# Patient Record
Sex: MALE | Race: WHITE | NOT HISPANIC OR LATINO | URBAN - METROPOLITAN AREA
[De-identification: names, ages, dates, MRNs, and addresses within clinical notes are randomized per-mention and may not be internally consistent; named-entity substitution may affect disease eponyms.]

---

## 2024-02-26 ENCOUNTER — LAB (OUTPATIENT)
Dept: LAB | Facility: LAB | Age: 29
End: 2024-02-26
Payer: COMMERCIAL

## 2024-02-26 ENCOUNTER — HOSPITAL ENCOUNTER (OUTPATIENT)
Dept: RADIOLOGY | Facility: CLINIC | Age: 29
Discharge: HOME | End: 2024-02-26
Payer: COMMERCIAL

## 2024-02-26 DIAGNOSIS — M25.542 PAIN IN JOINTS OF LEFT HAND: ICD-10-CM

## 2024-02-26 DIAGNOSIS — Z00.00 ENCOUNTER FOR GENERAL ADULT MEDICAL EXAMINATION WITHOUT ABNORMAL FINDINGS: ICD-10-CM

## 2024-02-26 DIAGNOSIS — E46 UNSPECIFIED PROTEIN-CALORIE MALNUTRITION (MULTI): ICD-10-CM

## 2024-02-26 DIAGNOSIS — M25.541 PAIN IN JOINTS OF RIGHT HAND: Primary | ICD-10-CM

## 2024-02-26 DIAGNOSIS — R53.83 OTHER FATIGUE: ICD-10-CM

## 2024-02-26 LAB
ALBUMIN SERPL BCP-MCNC: 4.2 G/DL (ref 3.4–5)
ALP SERPL-CCNC: 62 U/L (ref 33–120)
ALT SERPL W P-5'-P-CCNC: 21 U/L (ref 10–52)
ANION GAP SERPL CALC-SCNC: 13 MMOL/L (ref 10–20)
AST SERPL W P-5'-P-CCNC: 17 U/L (ref 9–39)
BILIRUB SERPL-MCNC: 0.4 MG/DL (ref 0–1.2)
BUN SERPL-MCNC: 17 MG/DL (ref 6–23)
CALCIUM SERPL-MCNC: 9.6 MG/DL (ref 8.6–10.6)
CHLORIDE SERPL-SCNC: 102 MMOL/L (ref 98–107)
CHOLEST SERPL-MCNC: 165 MG/DL (ref 0–199)
CHOLESTEROL/HDL RATIO: 4
CO2 SERPL-SCNC: 29 MMOL/L (ref 21–32)
CREAT SERPL-MCNC: 0.81 MG/DL (ref 0.5–1.3)
CRP SERPL HS-MCNC: 20.2 MG/L
EGFRCR SERPLBLD CKD-EPI 2021: >90 ML/MIN/1.73M*2
ERYTHROCYTE [SEDIMENTATION RATE] IN BLOOD BY WESTERGREN METHOD: 27 MM/H (ref 0–15)
GLUCOSE SERPL-MCNC: 72 MG/DL (ref 74–99)
HDLC SERPL-MCNC: 41.1 MG/DL
LDLC SERPL CALC-MCNC: 105 MG/DL
NON HDL CHOLESTEROL: 124 MG/DL (ref 0–149)
POTASSIUM SERPL-SCNC: 4.3 MMOL/L (ref 3.5–5.3)
PROT SERPL-MCNC: 7.1 G/DL (ref 6.4–8.2)
SODIUM SERPL-SCNC: 140 MMOL/L (ref 136–145)
TRIGL SERPL-MCNC: 95 MG/DL (ref 0–149)
VLDL: 19 MG/DL (ref 0–40)

## 2024-02-26 PROCEDURE — 86141 C-REACTIVE PROTEIN HS: CPT

## 2024-02-26 PROCEDURE — 36415 COLL VENOUS BLD VENIPUNCTURE: CPT

## 2024-02-26 PROCEDURE — 80061 LIPID PANEL: CPT

## 2024-02-26 PROCEDURE — 80053 COMPREHEN METABOLIC PANEL: CPT

## 2024-02-26 PROCEDURE — 85652 RBC SED RATE AUTOMATED: CPT

## 2024-02-26 PROCEDURE — 73130 X-RAY EXAM OF HAND: CPT | Mod: LEFT SIDE | Performed by: RADIOLOGY

## 2024-02-26 PROCEDURE — 73130 X-RAY EXAM OF HAND: CPT | Mod: LT

## 2024-03-18 ENCOUNTER — LAB (OUTPATIENT)
Dept: LAB | Facility: LAB | Age: 29
End: 2024-03-18
Payer: COMMERCIAL

## 2024-03-18 DIAGNOSIS — M25.531 ARTHRALGIA OF RIGHT WRIST: ICD-10-CM

## 2024-03-18 DIAGNOSIS — R70.0 ELEVATED ERYTHROCYTE SEDIMENTATION RATE: ICD-10-CM

## 2024-03-18 PROCEDURE — 36415 COLL VENOUS BLD VENIPUNCTURE: CPT

## 2024-03-18 PROCEDURE — 86038 ANTINUCLEAR ANTIBODIES: CPT

## 2024-03-19 LAB — ANA SER QL HEP2 SUBST: NEGATIVE

## 2024-08-05 ENCOUNTER — APPOINTMENT (OUTPATIENT)
Dept: RHEUMATOLOGY | Facility: CLINIC | Age: 29
End: 2024-08-05
Payer: COMMERCIAL

## 2024-08-05 ENCOUNTER — LAB (OUTPATIENT)
Dept: LAB | Facility: LAB | Age: 29
End: 2024-08-05
Payer: COMMERCIAL

## 2024-08-05 VITALS — DIASTOLIC BLOOD PRESSURE: 84 MMHG | SYSTOLIC BLOOD PRESSURE: 132 MMHG | WEIGHT: 245 LBS

## 2024-08-05 DIAGNOSIS — M19.90 ARTHRITIS: ICD-10-CM

## 2024-08-05 DIAGNOSIS — M19.90 ARTHRITIS: Primary | ICD-10-CM

## 2024-08-05 LAB
CCP IGG SERPL-ACNC: <1 U/ML
CRP SERPL-MCNC: 1.22 MG/DL
ERYTHROCYTE [DISTWIDTH] IN BLOOD BY AUTOMATED COUNT: 13.6 % (ref 11.5–14.5)
ERYTHROCYTE [SEDIMENTATION RATE] IN BLOOD BY WESTERGREN METHOD: 45 MM/H (ref 0–15)
HCT VFR BLD AUTO: 40.5 % (ref 41–52)
HGB BLD-MCNC: 13.1 G/DL (ref 13.5–17.5)
MCH RBC QN AUTO: 28.4 PG (ref 26–34)
MCHC RBC AUTO-ENTMCNC: 32.3 G/DL (ref 32–36)
MCV RBC AUTO: 88 FL (ref 80–100)
NRBC BLD-RTO: 0 /100 WBCS (ref 0–0)
PLATELET # BLD AUTO: 310 X10*3/UL (ref 150–450)
RBC # BLD AUTO: 4.62 X10*6/UL (ref 4.5–5.9)
RHEUMATOID FACT SER NEPH-ACNC: <10 IU/ML (ref 0–15)
URATE SERPL-MCNC: 5.4 MG/DL (ref 4–7.5)
WBC # BLD AUTO: 11.9 X10*3/UL (ref 4.4–11.3)

## 2024-08-05 PROCEDURE — 36415 COLL VENOUS BLD VENIPUNCTURE: CPT

## 2024-08-05 PROCEDURE — 81381 HLA I TYPING 1 ALLELE HR: CPT

## 2024-08-05 PROCEDURE — 84550 ASSAY OF BLOOD/URIC ACID: CPT

## 2024-08-05 PROCEDURE — 99204 OFFICE O/P NEW MOD 45 MIN: CPT | Performed by: INTERNAL MEDICINE

## 2024-08-05 PROCEDURE — 86431 RHEUMATOID FACTOR QUANT: CPT

## 2024-08-05 PROCEDURE — 86140 C-REACTIVE PROTEIN: CPT

## 2024-08-05 PROCEDURE — 85027 COMPLETE CBC AUTOMATED: CPT

## 2024-08-05 PROCEDURE — 86200 CCP ANTIBODY: CPT

## 2024-08-05 PROCEDURE — 85652 RBC SED RATE AUTOMATED: CPT

## 2024-08-05 NOTE — PROGRESS NOTES
"Subjective  . Dorian Tickle \"Dorian Tickle\" is a 29 y.o. male who presents for Joint Pain and New Patient Visit.    HPI.  29-year-old male with history of plantar fasciitis referred by primary care physician Elijah Sheehan in consultation for joint pain evaluation.    He stated that he has been having pain in left hand and both knees for more than a year.  Left hand pain is mainly in the left index and ring finger.  Left knee hurts more than right.  Sometimes he notes swelling of the left knee.  Joint pain is worse in the morning up to 6/10.  He takes 2 Aleve's and feels better after 3 hours.      X-ray of the hand obtained in February, 2024 was unremarkable.  Blood work obtained in March 2024 showed elevated sed rate and CRP.  ELVER was negative.    He has no history of review of pleuritic chest pain, shortness of breath, fever, chills, night sweats, weight loss, dry eyes, dry mouth, skin rash,photosensitivity, mucocutaneous ulcers, hair loss, iritis, Raynaud's phenomena, hematemesis, hemoptysis, anxiety, depression, seizures, chronic low back pain, psoriasis, recurrent infections and inflammatory bowel disease.     Social history: He is .  He quit smoking in 2018-19.  He rarely drinks alcoholic beverages.  He works with [employer].  Family history: Mother has carpal tunnel syndrome.    Review of Systems   All other systems reviewed and are negative.    Objective  .   Blood pressure 132/84, weight 111 kg (245 lb).    Physical Exam.  Gen. AAO x3, NAD.  HEENT: No pallor or icterus, PERRLA, EOMI. Oropharynx is clear. MM moist,Parotid glands  not enlarged. No cervical lymphadenopathy .  Skin: No rashes.  Heart: S1, S2/ RRR. No murmurs or gallops.  Lungs: CTA B.  Abdomen: Soft, NT/ND, BS regular.  MSK: Mild tenderness of the left index finger PIP and DIP joint.  Mild tenderness of the left ring finger PIP joint.  Mild tenderness of the ring finger flexor tendon at A1 pulley.  Bilateral wrist, elbow without swelling " and tenderness.  Bilateral shoulder without swelling and tenderness.  Neck, spine and SI joint without tenderness.  Bilateral Peewee's negative.  Bilateral trochanteric bursa without tenderness.  Bilateral knee without erythema, swelling or tenderness.  Knee range of motion full.  Bilateral ankle and MTPs without swelling and tenderness.    Neuro: CN II-XII intact. Sensation to touch intact.Strength 5/5 throughout. DTR 2+ and symmetrical.  Psych:Appropriate mood and behavior  EXT: No edema    Assessment/Plan  . 29-year-old male with history of plantar fasciitis presented with chronic pain in left hand and knees.  Previous x-ray of the left hand obtained about 6 months ago was unremarkable.  Blood work obtained in March showed elevated sed rate and CRP.  ELVER was negative.  Mild tenderness of the left fourth flexor tendon at A1 pulley.  No active synovitis the hands and wrists noted.  Both knees without tenderness and effusion.  Will obtain labs for further evaluation.  Continue Aleve as needed.     This note was partially generated using the Dragon Voice recognition system. There may be some incorrect wording, spelling and/or spelling errors or punctuation errors that were not corrected prior to committing the note to the medical record.      Problem List Items Addressed This Visit    None  Visit Diagnoses       Arthritis    -  Primary    Relevant Orders    Rheumatoid Factor    C-Reactive Protein    Citrulline Antibody, IgG    CBC    Sedimentation Rate    Uric Acid    HLAB27 Typing                 Active Ambulatory Problems     Diagnosis Date Noted    No Active Ambulatory Problems     Resolved Ambulatory Problems     Diagnosis Date Noted    No Resolved Ambulatory Problems     Past Medical History:   Diagnosis Date    Plantar fasciitis        Family History   Problem Relation Name Age of Onset    Carpal tunnel syndrome Mother         No past surgical history on file.    Social History     Tobacco Use   Smoking Status  Never   Smokeless Tobacco Current   Tobacco Comments    Pt states he only consumes nicotine patches        Allergies  Patient has no known allergies.    Current Meds  No current outpatient medications     Lab Results   Component Value Date    SEDRATE 27 (H) 02/26/2024             Rober Vega MD

## 2024-08-07 LAB — HLAB27 TYPING: NEGATIVE

## 2024-09-27 DIAGNOSIS — M25.562 CHRONIC PAIN OF LEFT KNEE: ICD-10-CM

## 2024-09-27 DIAGNOSIS — M25.572 ACUTE LEFT ANKLE PAIN: ICD-10-CM

## 2024-09-27 DIAGNOSIS — G89.29 CHRONIC PAIN OF LEFT KNEE: ICD-10-CM

## 2024-10-03 ENCOUNTER — APPOINTMENT (OUTPATIENT)
Dept: ORTHOPEDIC SURGERY | Facility: CLINIC | Age: 29
End: 2024-10-03
Payer: COMMERCIAL

## 2024-10-03 ENCOUNTER — HOSPITAL ENCOUNTER (OUTPATIENT)
Dept: RADIOLOGY | Facility: HOSPITAL | Age: 29
Discharge: HOME | End: 2024-10-03
Payer: COMMERCIAL

## 2024-10-03 VITALS — HEIGHT: 71 IN | WEIGHT: 245 LBS | BODY MASS INDEX: 34.3 KG/M2

## 2024-10-03 DIAGNOSIS — M76.62 LEFT ACHILLES TENDINITIS: ICD-10-CM

## 2024-10-03 DIAGNOSIS — M25.562 ANTERIOR KNEE PAIN, LEFT: Primary | ICD-10-CM

## 2024-10-03 DIAGNOSIS — G89.29 CHRONIC PAIN OF LEFT KNEE: ICD-10-CM

## 2024-10-03 DIAGNOSIS — M76.62 ACHILLES TENDINITIS OF LEFT LOWER EXTREMITY: ICD-10-CM

## 2024-10-03 DIAGNOSIS — M25.572 ACUTE LEFT ANKLE PAIN: ICD-10-CM

## 2024-10-03 DIAGNOSIS — M25.562 CHRONIC PAIN OF LEFT KNEE: ICD-10-CM

## 2024-10-03 DIAGNOSIS — M25.562 LEFT ANTERIOR KNEE PAIN: ICD-10-CM

## 2024-10-03 PROCEDURE — 73562 X-RAY EXAM OF KNEE 3: CPT | Mod: LT

## 2024-10-03 PROCEDURE — 73610 X-RAY EXAM OF ANKLE: CPT | Mod: LT

## 2024-10-03 PROCEDURE — 99204 OFFICE O/P NEW MOD 45 MIN: CPT | Performed by: SPECIALIST

## 2024-10-03 PROCEDURE — 3008F BODY MASS INDEX DOCD: CPT | Performed by: SPECIALIST

## 2024-10-03 PROCEDURE — 99214 OFFICE O/P EST MOD 30 MIN: CPT | Performed by: SPECIALIST

## 2024-10-03 NOTE — PROGRESS NOTES
NPV Left Knee/Ankle XR done today   Patient states he has had ongoing pain for several months no known injury   He has been working with a rheumatologist in the lab work has shown some elevated inflammatory markers, sed rate and CRP.  Negative ELVER negative RA negative HLA-B27.  His pain is with activity and relieved with rest without obvious neurovascular components.  Denies erythema warmth swelling and regions of pain which is anterolateral left knee and posterior left ankle.  He resolved a recent onset of plantar fasciitis with conservative measures.  He denies any recent history of infections or STDs.    Exam: Alert and oriented x 3 in no distress.  In stance he has normal lower extremity alignment no signs of erythema or swelling.  He has full passive motion of the left knee foot and ankle regions.  No palpable warmth, no effusion.  Has pain to palpation of the left knee lateral retinaculum of the patella.  Stable ligamentous testing full range of motion.  Left ankle exam is negative except for pain to palpation of his Achilles tendon.    Standing radiographs left knee and left ankle are completely within normal today.    Assessment plan: Left anterior knee pain syndrome, left Achilles tendinitis.  I would still be concerned that there is an undiagnosed rheumatologic syndrome to explain all these regions of pain.  In the meantime for the ankle and knee were going to recommend a course of physical therapy, and he should routinely take his Aleve twice a day for a month.  Follow-up if no improvement, but I reassured him there is no obvious orthopedic surgical issues today.

## 2025-02-10 ENCOUNTER — APPOINTMENT (OUTPATIENT)
Dept: RHEUMATOLOGY | Facility: CLINIC | Age: 30
End: 2025-02-10
Payer: COMMERCIAL

## 2025-02-10 VITALS
SYSTOLIC BLOOD PRESSURE: 116 MMHG | DIASTOLIC BLOOD PRESSURE: 78 MMHG | HEIGHT: 71 IN | BODY MASS INDEX: 34.58 KG/M2 | WEIGHT: 247 LBS | HEART RATE: 72 BPM

## 2025-02-10 DIAGNOSIS — L40.9 PSORIASIS: ICD-10-CM

## 2025-02-10 DIAGNOSIS — L40.50 PSORIATIC ARTHRITIS (MULTI): Primary | ICD-10-CM

## 2025-02-10 PROCEDURE — 3008F BODY MASS INDEX DOCD: CPT | Performed by: INTERNAL MEDICINE

## 2025-02-10 PROCEDURE — 99213 OFFICE O/P EST LOW 20 MIN: CPT | Performed by: INTERNAL MEDICINE

## 2025-02-10 RX ORDER — METHOTREXATE 2.5 MG/1
15 TABLET ORAL WEEKLY
Qty: 24 TABLET | Refills: 1 | Status: SHIPPED | OUTPATIENT
Start: 2025-02-10 | End: 2025-03-12

## 2025-02-10 RX ORDER — FOLIC ACID 1 MG/1
1 TABLET ORAL DAILY
Qty: 30 TABLET | Refills: 1 | Status: SHIPPED | OUTPATIENT
Start: 2025-02-10 | End: 2025-03-12

## 2025-02-10 RX ORDER — CLOBETASOL PROPIONATE 0.5 MG/G
OINTMENT TOPICAL 2 TIMES DAILY
Qty: 15 G | Refills: 3 | Status: SHIPPED | OUTPATIENT
Start: 2025-02-10

## 2025-02-10 ASSESSMENT — RHEUMATOLOGY NEW PATIENT QUESTIONNAIRE
MEMORY LOSS: N
UNEXPLAINED HEARING LOSS: N
EASILY LOSING TEMPER: N
ANXIETY: N
DEPRESSION: N
EYE REDNESS: N
NUMBNESS OR TINGLING IN HANDS OR FEET: N
HOW WOULD YOU DESCRIBE YOUR STIFFNESS ON AVERAGE: MODERATE
SUN SENSITIVE (SUN ALLERGY): N
LOSS OF VISION: N
MORNING STIFFNESS IN LOWER BACK: N
SKIN REDNESS: N
UNUSUAL BLEEDING: N
MUSCLE WEAKNESS: N
NODULES/BUMPS: N
HEADACHES: N
SORES IN MOUTH OR NOSE: N
SWOLLEN LEGS OR FEET: N
STOMACH PAIN: N
SHORTNESS OF BREATH: N
BLOOD IN STOOLS: N
BEHAVIORAL CHANGES: N
SKIN TIGHTNESS: N
MORNING STIFFNESS: Y
NAUSEA: N
EYE PAIN: N
ABNORMAL URINE: N
PERSISTENT DIARRHEA: N
DIFFICULTY SWALLOWING: N
PAIN OR BURNING ON URINATION: N
DIFFICULTY FALLING ASLEEP: N
DIFFICULTY BREATHING LYING DOWN: N
FEVER: N
DRYNESS OF MOUTH: N
JOINT SWELLING: Y
BLACK STOOLS: N
EASY BRUISING: N
COUGH: N
HOARSE VOICE: N
SEIZURES: N
VOMITING OF BLOOD OR COFFEE GROUND CONSISTENCY MATERIAL: N
DOUBLE OR BLURRED VISION: N
LOSS OF CONSCIOUSNESS: N
UNUSUAL FATIGUE: N
COLOR CHANGES OF HANDS OR FEET IN THE COLD: N
UNUSUALLY RAPID OR SLOWED HEART RATE: N
SWOLLEN OR TENDER GLANDS: N
NIGHT SWEATS: N
EXCESSIVE HAIR LOSS (MORE THAN YOUR NORM): N
INCREASED SUSCEPTIBILITY TO INFECTION: N
FAINTING: N
UNEXPLAINED WEIGHT CHANGE: N
JOINT PAIN: Y
HEARTBURN OR REFLUX: N
ANEMIA: N
RASH: Y
EYE DRYNESS: N
AGITATION: N
CHEST PAIN: N
RASH OR ULCERS: N
DIFFICULTY STAYING ASLEEP: N
JAUNDICE: N

## 2025-02-10 ASSESSMENT — PAIN SCALES - GENERAL: PAINLEVEL_OUTOF10: 2

## 2025-02-10 NOTE — PROGRESS NOTES
"Subjective . Dorian Khoury is a 29 y.o. male who presents for Establish Care (Joint stiffness, rash on the head,).    HPI. 29-year-old male with history of left Achilles tendinitis, plantar fasciitis presented with chronic pain in left hand and knees presented for follow-up.     He reports pain, swelling and stiffness of the left hand with difficulty of making full fist.  Also reports pain in the left foot.  Joint pain is worse in the morning and in the evening.  He reports morning stiffness up to 1 hour.     He has noted dry scaly rashes at the forehead and that the bellybutton about 3 months ago.    Review of Systems   All other systems reviewed and are negative.    Objective     Blood pressure 116/78, pulse 72, height 1.803 m (5' 11\"), weight 112 kg (247 lb).    Physical Exam.  Gen. AAO x3, NAD.  HEENT: No pallor or icterus, PERRLA, EOMI. No cervical lymphadenopathy .  Skin: 3 x 3 cm erythematous with dry scaling rash at the forehead and erythematous rash at the bellybutton.  Heart: S1, S2/ RRR.   Lungs: CTA B.  Abdomen: Soft, NT/ND, BS regular.  MSK: Left 2, 3 MCP and left middle finger PIP joint with swelling and tenderness.  Right hand bilateral wrist, elbow and shoulder without swelling and tenderness.  Knee without swelling and tenderness.  Bilateral ankle and MTPs without swelling and tenderness.   Neuro:  Sensation to touch intact.Strength 5/5 throughout.   Psych:Appropriate mood and behavior  EXT: No edema    Assessment/Plan .    #1: Psoriatic arthritis.  -Patient was counseled regarding the diagnosis, management and outcomes in length.  -Begin methotrexate 15 mg/week.  Side effect discussed in length.  -Begin folic acid 1 mg daily.    #2: Psoriasis.  -Begin clobetasol.    Follow-up in 2 months.     This note was partially generated using the Dragon Voice recognition system. There may be some incorrect wording, spelling and/or spelling errors or punctuation errors that were not corrected prior to " committing the note to the medical record.            Problem List Items Addressed This Visit    None  Visit Diagnoses       Psoriasis    -  Primary    Relevant Medications    clobetasol (Temovate) 0.05 % ointment    Psoriatic arthritis (Multi)        Relevant Medications    methotrexate (Trexall) 2.5 mg tablet    folic acid (Folvite) 1 mg tablet                 Active Ambulatory Problems     Diagnosis Date Noted    No Active Ambulatory Problems     Resolved Ambulatory Problems     Diagnosis Date Noted    No Resolved Ambulatory Problems     Past Medical History:   Diagnosis Date    Plantar fasciitis        Family History   Problem Relation Name Age of Onset    Carpal tunnel syndrome Mother         No past surgical history on file.    Social History     Tobacco Use   Smoking Status Never   Smokeless Tobacco Current   Tobacco Comments    Pt states he only consumes nicotine patches        Allergies  Patient has no known allergies.    Current Meds  Current Outpatient Medications   Medication Instructions    clobetasol (Temovate) 0.05 % ointment Topical, 2 times daily    folic acid (FOLVITE) 1 mg, oral, Daily    methotrexate (TREXALL) 15 mg, oral, Weekly, Follow directions carefully, and ask to explain any part you do not understand. Take exactly as directed.        Lab Results   Component Value Date    HLAB27 Negative 08/05/2024    RF <10 08/05/2024    SEDRATE 45 (H) 08/05/2024    CRP 1.22 (H) 08/05/2024    URICACID 5.4 08/05/2024             Rober Vega MD

## 2025-03-30 ENCOUNTER — OFFICE VISIT (OUTPATIENT)
Dept: URGENT CARE | Age: 30
End: 2025-03-30
Payer: COMMERCIAL

## 2025-03-30 VITALS
OXYGEN SATURATION: 99 % | TEMPERATURE: 98.7 F | SYSTOLIC BLOOD PRESSURE: 138 MMHG | DIASTOLIC BLOOD PRESSURE: 90 MMHG | HEART RATE: 98 BPM | RESPIRATION RATE: 17 BRPM

## 2025-03-30 DIAGNOSIS — R10.12 LEFT UPPER QUADRANT ABDOMINAL PAIN: ICD-10-CM

## 2025-03-30 DIAGNOSIS — R19.7 DIARRHEA, UNSPECIFIED TYPE: ICD-10-CM

## 2025-03-30 DIAGNOSIS — R11.2 NAUSEA AND VOMITING, UNSPECIFIED VOMITING TYPE: Primary | ICD-10-CM

## 2025-03-30 PROCEDURE — 99204 OFFICE O/P NEW MOD 45 MIN: CPT | Performed by: NURSE PRACTITIONER

## 2025-03-30 RX ORDER — DICYCLOMINE HYDROCHLORIDE 20 MG/1
20 TABLET ORAL 4 TIMES DAILY PRN
Qty: 20 TABLET | Refills: 0 | Status: SHIPPED | OUTPATIENT
Start: 2025-03-30 | End: 2025-04-04

## 2025-03-30 ASSESSMENT — ENCOUNTER SYMPTOMS
ARTHRALGIAS: 0
ABDOMINAL PAIN: 1
CHILLS: 0
RECENT COUGH: 0
HEADACHES: 0
MYALGIAS: 0
VOMITING: 0
DIARRHEA: 1
FEVER: 0
DIAPHORESIS: 0

## 2025-03-30 NOTE — PROGRESS NOTES
Subjective   Patient ID: Dorian Khoury is a 29 y.o. male. They present today with a chief complaint of Abdominal Pain (Complains of abdominal pain, possible nausea and diarrhea for 3 days. ).    History of Present Illness  Patient denies nausea and vomiting today.      History provided by:  Patient   used: No    Diarrhea  Quality:  Watery  Onset quality:  Sudden  Number of episodes:  Several  Duration:  3 days  Timing:  Intermittent  Progression:  Unchanged  Relieved by:  None tried  Worsened by:  Nothing  Ineffective treatments:  None tried  Associated symptoms: abdominal pain    Associated symptoms: no arthralgias, no chills, no recent cough, no diaphoresis, no fever, no headaches, no myalgias, no URI and no vomiting    Associated symptoms comment:  Patient denies nausea and bloody stool.      Past Medical History  Allergies as of 03/30/2025    (No Known Allergies)       (Not in a hospital admission)       Past Medical History:   Diagnosis Date    Plantar fasciitis        No past surgical history on file.     reports that he has never smoked. He uses smokeless tobacco.    Review of Systems  Review of Systems   Constitutional:  Negative for chills, diaphoresis and fever.   Gastrointestinal:  Positive for abdominal pain and diarrhea. Negative for vomiting.   Musculoskeletal:  Negative for arthralgias and myalgias.   Neurological:  Negative for headaches.                                  Objective    Vitals:    03/30/25 1937   BP: 138/90   Pulse: 98   Resp: 17   Temp: 37.1 °C (98.7 °F)   TempSrc: Oral   SpO2: 99%     No LMP for male patient.    Physical Exam  Vitals and nursing note reviewed.   Constitutional:       Appearance: Normal appearance.   HENT:      Head: Normocephalic and atraumatic.   Cardiovascular:      Rate and Rhythm: Normal rate and regular rhythm.   Pulmonary:      Effort: Pulmonary effort is normal.      Breath sounds: Normal breath sounds.   Abdominal:      General: Abdomen is  protuberant. Bowel sounds are increased. There is no distension.      Palpations: Abdomen is soft. There is no mass.      Tenderness: There is abdominal tenderness in the left upper quadrant and left lower quadrant. There is no guarding or rebound. Negative signs include McBurney's sign.      Hernia: No hernia is present.      Comments: Hyperactive bowel sounds in all quadrants.     Neurological:      Mental Status: He is alert.         Procedures    Point of Care Test & Imaging Results from this visit  No results found for this visit on 03/30/25.   Imaging  No results found.    Cardiology, Vascular, and Other Imaging  No other imaging results found for the past 2 days      Diagnostic study results (if any) were reviewed by SUZETTE Davis.    Assessment/Plan   Allergies, medications, history, and pertinent labs/EKGs/Imaging reviewed by SUZETTE Davis.     Medical Decision Making  Encourage fluid and bland diet. Advance diet as tolerated.  Take Peptobismol as needed for diarrhea.  Take Bentyl as needed for abdominal pain.    Return or f/u with PCP if symptoms worsened and/or did not improve in 3-5 days.  If symptoms worsened anytime, to call 911 or go to the nearest ER.   Pt verbalized understanding of the instructions and left in stable condition.      Orders and Diagnoses  Diagnoses and all orders for this visit:  Nausea and vomiting, unspecified vomiting type  Diarrhea, unspecified type  Left upper quadrant abdominal pain  -     dicyclomine (Bentyl) 20 mg tablet; Take 1 tablet (20 mg) by mouth 4 times a day as needed (abdominal pain) for up to 5 days.      Medical Admin Record      Patient disposition: Home    Electronically signed by SUZETTE Daivs  7:57 PM

## 2025-04-08 ENCOUNTER — APPOINTMENT (OUTPATIENT)
Dept: RHEUMATOLOGY | Facility: CLINIC | Age: 30
End: 2025-04-08
Payer: COMMERCIAL

## 2025-04-10 DIAGNOSIS — L40.50 PSORIATIC ARTHRITIS (MULTI): ICD-10-CM

## 2025-04-13 RX ORDER — METHOTREXATE 2.5 MG/1
TABLET ORAL
Qty: 24 TABLET | Refills: 1 | OUTPATIENT
Start: 2025-04-13

## 2025-05-03 DIAGNOSIS — L40.50 PSORIATIC ARTHRITIS (MULTI): ICD-10-CM

## 2025-05-06 RX ORDER — FOLIC ACID 1 MG/1
1 TABLET ORAL DAILY
Qty: 30 TABLET | Refills: 0 | Status: SHIPPED | OUTPATIENT
Start: 2025-05-06

## 2025-05-06 NOTE — TELEPHONE ENCOUNTER
Prescription Request        Has The Patient Been Identified By Name And Date Of Birth:v  Yes    RX Requestor: Pharmacy    Date of Last Refill: 02/10/2025    Date Of Last Office Visit: 02/10/2025    Date Of Future Office Visit: Reached out to patient to establish care with new provider